# Patient Record
Sex: FEMALE | Race: BLACK OR AFRICAN AMERICAN | NOT HISPANIC OR LATINO | Employment: FULL TIME | URBAN - NONMETROPOLITAN AREA
[De-identification: names, ages, dates, MRNs, and addresses within clinical notes are randomized per-mention and may not be internally consistent; named-entity substitution may affect disease eponyms.]

---

## 2022-06-22 ENCOUNTER — HOSPITAL ENCOUNTER (EMERGENCY)
Facility: HOSPITAL | Age: 47
Discharge: OTHER INSTITUTION | End: 2022-06-22
Payer: COMMERCIAL

## 2022-06-22 VITALS
HEART RATE: 53 BPM | RESPIRATION RATE: 16 BRPM | SYSTOLIC BLOOD PRESSURE: 140 MMHG | OXYGEN SATURATION: 99 % | DIASTOLIC BLOOD PRESSURE: 81 MMHG | HEIGHT: 59 IN | WEIGHT: 202 LBS | TEMPERATURE: 98 F | BODY MASS INDEX: 40.72 KG/M2

## 2022-06-22 DIAGNOSIS — R07.9 CHEST PAIN: Primary | ICD-10-CM

## 2022-06-22 LAB
ALBUMIN SERPL BCP-MCNC: 3.4 G/DL (ref 3.5–5)
ALBUMIN/GLOB SERPL: 0.9 {RATIO}
ALP SERPL-CCNC: 69 U/L (ref 37–98)
ALT SERPL W P-5'-P-CCNC: 19 U/L (ref 13–56)
ANION GAP SERPL CALCULATED.3IONS-SCNC: 8 MMOL/L (ref 7–16)
AST SERPL W P-5'-P-CCNC: 18 U/L (ref 15–37)
BASOPHILS # BLD AUTO: 0.03 K/UL (ref 0–0.2)
BASOPHILS NFR BLD AUTO: 0.6 % (ref 0–1)
BILIRUB SERPL-MCNC: 0.3 MG/DL (ref 0–1.2)
BUN SERPL-MCNC: 12 MG/DL (ref 7–18)
BUN/CREAT SERPL: 16 (ref 6–20)
CALCIUM SERPL-MCNC: 8.9 MG/DL (ref 8.5–10.1)
CHLORIDE SERPL-SCNC: 103 MMOL/L (ref 98–107)
CHOLEST SERPL-MCNC: 179 MG/DL (ref 0–200)
CHOLEST/HDLC SERPL: 3.4 {RATIO}
CO2 SERPL-SCNC: 32 MMOL/L (ref 21–32)
CREAT SERPL-MCNC: 0.76 MG/DL (ref 0.55–1.02)
DIFFERENTIAL METHOD BLD: ABNORMAL
EOSINOPHIL # BLD AUTO: 0.05 K/UL (ref 0–0.5)
EOSINOPHIL NFR BLD AUTO: 1 % (ref 1–4)
ERYTHROCYTE [DISTWIDTH] IN BLOOD BY AUTOMATED COUNT: 11.6 % (ref 11.5–14.5)
GLOBULIN SER-MCNC: 3.8 G/DL (ref 2–4)
GLUCOSE SERPL-MCNC: 96 MG/DL (ref 74–106)
HCT VFR BLD AUTO: 36.1 % (ref 38–47)
HDLC SERPL-MCNC: 52 MG/DL (ref 40–60)
HGB BLD-MCNC: 11.8 G/DL (ref 12–16)
LDLC SERPL CALC-MCNC: 118 MG/DL
LDLC/HDLC SERPL: 2.3 {RATIO}
LYMPHOCYTES # BLD AUTO: 2.21 K/UL (ref 1–4.8)
LYMPHOCYTES NFR BLD AUTO: 43.6 % (ref 27–41)
MAGNESIUM SERPL-MCNC: 2 MG/DL (ref 1.7–2.3)
MCH RBC QN AUTO: 28.5 PG (ref 27–31)
MCHC RBC AUTO-ENTMCNC: 32.7 G/DL (ref 32–36)
MCV RBC AUTO: 87.2 FL (ref 80–96)
MONOCYTES # BLD AUTO: 0.48 K/UL (ref 0–0.8)
MONOCYTES NFR BLD AUTO: 9.5 % (ref 2–6)
MPC BLD CALC-MCNC: 9.7 FL (ref 9.4–12.4)
NEUTROPHILS # BLD AUTO: 2.3 K/UL (ref 1.8–7.7)
NEUTROPHILS NFR BLD AUTO: 45.3 % (ref 53–65)
NONHDLC SERPL-MCNC: 127 MG/DL
NT-PROBNP SERPL-MCNC: 58 PG/ML (ref 1–125)
PLATELET # BLD AUTO: 322 K/UL (ref 150–400)
POTASSIUM SERPL-SCNC: 3.7 MMOL/L (ref 3.5–5.1)
PROT SERPL-MCNC: 7.2 G/DL (ref 6.4–8.2)
RBC # BLD AUTO: 4.14 M/UL (ref 4.2–5.4)
SODIUM SERPL-SCNC: 139 MMOL/L (ref 136–145)
TRIGL SERPL-MCNC: 43 MG/DL (ref 35–150)
TROPONIN I SERPL HS-MCNC: 4.8 PG/ML
TROPONIN I SERPL HS-MCNC: 5.6 PG/ML
VLDLC SERPL-MCNC: 9 MG/DL
WBC # BLD AUTO: 5.07 K/UL (ref 4.5–11)

## 2022-06-22 PROCEDURE — 99285 EMERGENCY DEPT VISIT HI MDM: CPT | Mod: 25

## 2022-06-22 PROCEDURE — 85025 COMPLETE CBC W/AUTO DIFF WBC: CPT | Performed by: NURSE PRACTITIONER

## 2022-06-22 PROCEDURE — 83880 ASSAY OF NATRIURETIC PEPTIDE: CPT | Performed by: NURSE PRACTITIONER

## 2022-06-22 PROCEDURE — 80061 LIPID PANEL: CPT | Performed by: NURSE PRACTITIONER

## 2022-06-22 PROCEDURE — 93010 ELECTROCARDIOGRAM REPORT: CPT | Mod: ,,, | Performed by: INTERNAL MEDICINE

## 2022-06-22 PROCEDURE — 83735 ASSAY OF MAGNESIUM: CPT | Performed by: NURSE PRACTITIONER

## 2022-06-22 PROCEDURE — 93005 ELECTROCARDIOGRAM TRACING: CPT

## 2022-06-22 PROCEDURE — 84484 ASSAY OF TROPONIN QUANT: CPT | Performed by: NURSE PRACTITIONER

## 2022-06-22 PROCEDURE — 99284 PR EMERGENCY DEPT VISIT,LEVEL IV: ICD-10-PCS | Mod: ,,, | Performed by: NURSE PRACTITIONER

## 2022-06-22 PROCEDURE — 80053 COMPREHEN METABOLIC PANEL: CPT | Performed by: NURSE PRACTITIONER

## 2022-06-22 PROCEDURE — 25000003 PHARM REV CODE 250: Performed by: NURSE PRACTITIONER

## 2022-06-22 PROCEDURE — 99284 EMERGENCY DEPT VISIT MOD MDM: CPT | Mod: ,,, | Performed by: NURSE PRACTITIONER

## 2022-06-22 PROCEDURE — 93010 EKG 12-LEAD: ICD-10-PCS | Mod: 76,,, | Performed by: INTERNAL MEDICINE

## 2022-06-22 PROCEDURE — 36415 COLL VENOUS BLD VENIPUNCTURE: CPT | Performed by: NURSE PRACTITIONER

## 2022-06-22 RX ORDER — POTASSIUM CHLORIDE 20 MEQ/1
20 TABLET, EXTENDED RELEASE ORAL DAILY PRN
COMMUNITY
Start: 2022-04-01

## 2022-06-22 RX ORDER — ASPIRIN 325 MG
325 TABLET ORAL
Status: COMPLETED | OUTPATIENT
Start: 2022-06-22 | End: 2022-06-22

## 2022-06-22 RX ORDER — FUROSEMIDE 40 MG/1
40 TABLET ORAL DAILY
COMMUNITY
Start: 2022-04-01

## 2022-06-22 RX ORDER — ATENOLOL 50 MG/1
50 TABLET ORAL DAILY
COMMUNITY
Start: 2022-06-17

## 2022-06-22 RX ADMIN — NITROGLYCERIN 0.5 INCH: 20 OINTMENT TOPICAL at 11:06

## 2022-06-22 RX ADMIN — ASPIRIN 325 MG ORAL TABLET 325 MG: 325 PILL ORAL at 09:06

## 2022-06-22 NOTE — ED TRIAGE NOTES
Presents to ED with c/o chest pain on and off since last pm.  Sent to ED for evaluation by PCP, CRISELDA Barbour.  Mahsa denies nausea or SOB.  Woke up several times during the night with it hurting.  Report has been having a lot of swelling in her legs.

## 2022-06-22 NOTE — ED PROVIDER NOTES
Encounter Date: 6/22/2022       History     Chief Complaint   Patient presents with    Chest Pain     Pamela Rose is a 47 y/o AAF who presents to the ED with complaint of mid sternum chest pain that radiates to her back which woke her up at 12 midnight last night.    Has seen Dr. Ferny Michelle III at [facility] cardiology in [place] for similar symptoms in  2019.   Hx: partial hysterectomy    The history is provided by the patient.   Chest Pain  The current episode started several hours ago (12 midnight, woke her up from sleep). Duration of episode(s) is 1 minute. Chest pain occurs intermittently. The chest pain is unchanged. Associated with: movement. At its most intense, the chest pain is at 6/10. The chest pain is currently at 0/10. The quality of the pain is described as dull, similar to previous episodes and pressure-like. The pain radiates to the mid back. Exacerbated by: nothing. Pertinent negatives for primary symptoms include no fever, no fatigue, no syncope, no shortness of breath, no cough, no wheezing, no palpitations, no abdominal pain, no nausea, no vomiting, no dizziness and no altered mental status.   Pertinent negatives for associated symptoms include no diaphoresis, no lower extremity edema, no near-syncope, no numbness, no orthopnea, no paroxysmal nocturnal dyspnea and no weakness. She tried nothing for the symptoms. Risk factors include obesity and sedentary lifestyle.   Her past medical history is significant for hypertension.   Her family medical history is significant for CAD, heart disease, hypertension and early MI.   Pertinent negatives for family medical history include: no sudden death. Family history comments: mother had MI at age 36   Procedure history is positive for echocardiogram.   Procedure history is negative for cardiac catheterization, exercise treadmill test and coronary CTA. Procedure history comments: normal echocardiogram with EF 55-60% on 04/05/2019..     Review of  patient's allergies indicates:  Not on File  Past Medical History:   Diagnosis Date    Hypertension      Past Surgical History:   Procedure Laterality Date     SECTION      x3    HYSTERECTOMY       No family history on file.  Social History     Tobacco Use    Smoking status: Never Smoker    Smokeless tobacco: Never Used   Substance Use Topics    Alcohol use: Never    Drug use: Never     Review of Systems   Constitutional: Negative.  Negative for activity change, appetite change, diaphoresis, fatigue and fever.   HENT: Negative.    Eyes: Negative.    Respiratory: Negative.  Negative for cough, shortness of breath and wheezing.    Cardiovascular: Positive for chest pain and leg swelling. Negative for palpitations, orthopnea, syncope and near-syncope.        Swelling in BLE at times   Gastrointestinal: Negative for abdominal pain, diarrhea, nausea and vomiting.   Genitourinary: Negative.  Negative for dysuria and frequency.   Musculoskeletal: Negative.    Skin: Negative.    Neurological: Negative for dizziness, weakness and numbness.   Hematological: Negative.    Psychiatric/Behavioral: Negative.        Physical Exam     Initial Vitals [22 0930]   BP Pulse Resp Temp SpO2   124/76 60 16 97.9 °F (36.6 °C) 98 %      MAP       --         Physical Exam    Nursing note and vitals reviewed.  Constitutional: She appears well-developed and well-nourished. She is cooperative. She does not appear ill. No distress.   HENT:   Head: Normocephalic and atraumatic.   Right Ear: External ear normal.   Left Ear: External ear normal.   Mouth/Throat: Mucous membranes are normal.   Eyes: Conjunctivae and EOM are normal. Pupils are equal, round, and reactive to light.   Neck: Neck supple.   Normal range of motion.  Cardiovascular: Normal rate, regular rhythm and normal heart sounds.   Pulses:       Radial pulses are 2+ on the right side and 2+ on the left side.        Posterior tibial pulses are 2+ on the right side and  2+ on the left side.   No edema to BLE   Pulmonary/Chest: Effort normal and breath sounds normal. She exhibits no tenderness and no bony tenderness.   Abdominal: Abdomen is soft. Bowel sounds are normal. There is no abdominal tenderness.   Musculoskeletal:      Cervical back: Normal range of motion and neck supple.     Lymphadenopathy:     She has no cervical adenopathy.   Neurological: She is alert and oriented to person, place, and time. GCS eye subscore is 4. GCS verbal subscore is 5. GCS motor subscore is 6.   Skin: Skin is warm, dry and intact. Capillary refill takes less than 2 seconds. No rash noted.   Psychiatric: She has a normal mood and affect. Her speech is normal and behavior is normal.         Medical Screening Exam   See Full Note    ED Course   Procedures  Labs Reviewed   COMPREHENSIVE METABOLIC PANEL - Abnormal; Notable for the following components:       Result Value    Albumin 3.4 (*)     All other components within normal limits   CBC WITH DIFFERENTIAL - Abnormal; Notable for the following components:    RBC 4.14 (*)     Hemoglobin 11.8 (*)     Hematocrit 36.1 (*)     Neutrophils % 45.3 (*)     Lymphocytes % 43.6 (*)     Monocytes % 9.5 (*)     All other components within normal limits   TROPONIN I - Normal   NT-PRO NATRIURETIC PEPTIDE - Normal   MAGNESIUM - Normal   TROPONIN I - Normal   CBC W/ AUTO DIFFERENTIAL    Narrative:     The following orders were created for panel order CBC auto differential.  Procedure                               Abnormality         Status                     ---------                               -----------         ------                     CBC with Differential[[id number]]        Abnormal            Final result                 Please view results for these tests on the individual orders.   LIPID PANEL     EKG Readings: (Independently Interpreted)   Initial Reading: No STEMI. Heart Rate: 62. Other Impression: Sinus rhythm with sinus arrhythmia     ECG Results           EKG 12-lead (In process)  Result time 06/22/22 11:43:24    In process by Interface, Lab In [facility] (06/22/22 11:43:24)                 Narrative:    Test Reason : R07.9,    Vent. Rate : 053 BPM     Atrial Rate : 053 BPM     P-R Int : 160 ms          QRS Dur : 084 ms      QT Int : 464 ms       P-R-T Axes : 055 -16 020 degrees     QTc Int : 435 ms    Sinus bradycardia  Minimal voltage criteria for LVH, may be normal variant  Borderline Abnormal ECG  When compared with ECG of 22-JUN-2022 09:42,  No significant change was found    Referred By: [email or web address]   SELF           Confirmed By:                              EKG 12-lead (In process)  Result time 06/22/22 09:49:31    In process by Interface, Lab In [facility] (06/22/22 09:49:31)                 Narrative:    Test Reason : R07.9,    Vent. Rate : 062 BPM     Atrial Rate : 062 BPM     P-R Int : 156 ms          QRS Dur : 080 ms      QT Int : 436 ms       P-R-T Axes : 060 -21 012 degrees     QTc Int : 442 ms    Normal sinus rhythm with sinus arrhythmia  Moderate voltage criteria for LVH, may be normal variant  Borderline Abnormal ECG  No previous ECGs available    Referred By: AAAREFERR   SELF           Confirmed By:                             Imaging Results          X-Ray Chest AP Portable (Final result)  Result time 06/22/22 09:57:55    Final result by Milan Luther II, MD (06/22/22 09:57:55)                 Impression:      No evidence of cardiopulmonary disease.      Electronically signed by: Milan Luther  Date:    06/22/2022  Time:    09:57             Narrative:    EXAMINATION:  XR CHEST AP PORTABLE    CLINICAL HISTORY:  Chest Pain;    COMPARISON:  One February 2020    FINDINGS:  The heart and mediastinum are normal in size and configuration.  The pulmonary vascularity is normal in caliber.  No lung infiltrates, effusions, pneumothorax or other abnormality is demonstrated.                                 Medications   aspirin tablet 325 mg (325 mg Oral  Given 6/22/22 0984)   nitroGLYCERIN 2% TD oint ointment 0.5 inch (0.5 inches Topical (Top) Given 6/22/22 1131)     Medical Decision Making:   Clinical Tests:   Lab Tests: Ordered and Reviewed  The following lab test(s) were unremarkable: CBC, CMP, Troponin and BNP  Radiological Study: Ordered  ED Management:  11:15 During inital exam patient had no chest pain. Was given  mg upon arrival. EKG NSR, HR 62 bpm. Troponin 5.6. Unable to reproduce pain with palpation or movement. Reviewed all test results with patient. Now reports that pain comes and goes while lying on stretcher here in the ED. Pain is dull with tightness in center of chest, lasting few seconds then resolves, no radiation of pain, rates as 6 on 1-10 pain scale. No sob, N/V or diapheresis.  Will repeat EKG. Place NTG top to chest wall.   HEART Score is 2  11:42 Repeat EKG Sinus juancarlos with HR at 53 bpm. BP dropped down to 98/77 HR 50. Nitropaste removed. Denies any chest pain at present.   12:13 Lipid panel normal. Second troponin 4.8. Reviewed all lab,xray and test results with patient. Patient continues to have chest pain that comes and goes while in the ED. Instructed her that she would need an evaluation by cardiology due to continued chest pain that is not reproducible. She wishes to go back to [facility] in [place] where Dr. Michelle III. Instructed patient that I would put in transfer request, but may not be able to be transferred to [facility] that it may be another hospital.    13:00 Call from [facility]. [facility] and [facility] at [facility]. Patient requested .   13:23: Call from [facility]. Spoke with transfer center at . Dr. Giles. [phone].                    Clinical Impression:   Final diagnoses:  [R07.9] Chest pain (Primary)          ED Disposition Condition    Transfer to Another Facility Stable                     Marybeth Echols, CRISELDA  06/22/22 4476

## 2022-06-23 ENCOUNTER — TELEPHONE (OUTPATIENT)
Dept: EMERGENCY MEDICINE | Facility: HOSPITAL | Age: 47
End: 2022-06-23
Payer: COMMERCIAL

## 2022-06-24 ENCOUNTER — TELEPHONE (OUTPATIENT)
Dept: EMERGENCY MEDICINE | Facility: HOSPITAL | Age: 47
End: 2022-06-24
Payer: COMMERCIAL

## 2022-06-25 ENCOUNTER — TELEPHONE (OUTPATIENT)
Dept: EMERGENCY MEDICINE | Facility: HOSPITAL | Age: 47
End: 2022-06-25
Payer: COMMERCIAL

## 2023-01-03 PROCEDURE — 99283 PR EMERGENCY DEPT VISIT,LEVEL III: ICD-10-PCS | Mod: ,,, | Performed by: REGISTERED NURSE

## 2023-01-03 PROCEDURE — 87428 SARSCOV & INF VIR A&B AG IA: CPT | Performed by: REGISTERED NURSE

## 2023-01-03 PROCEDURE — 87081 CULTURE SCREEN ONLY: CPT | Performed by: REGISTERED NURSE

## 2023-01-03 PROCEDURE — 99282 EMERGENCY DEPT VISIT SF MDM: CPT

## 2023-01-03 PROCEDURE — 99283 EMERGENCY DEPT VISIT LOW MDM: CPT | Mod: ,,, | Performed by: REGISTERED NURSE

## 2023-01-03 PROCEDURE — 87880 STREP A ASSAY W/OPTIC: CPT | Performed by: REGISTERED NURSE

## 2023-01-04 ENCOUNTER — HOSPITAL ENCOUNTER (EMERGENCY)
Facility: HOSPITAL | Age: 48
Discharge: HOME | End: 2023-01-04
Payer: COMMERCIAL

## 2023-01-04 VITALS
RESPIRATION RATE: 18 BRPM | TEMPERATURE: 98 F | SYSTOLIC BLOOD PRESSURE: 124 MMHG | BODY MASS INDEX: 40.32 KG/M2 | WEIGHT: 200 LBS | DIASTOLIC BLOOD PRESSURE: 86 MMHG | HEIGHT: 59 IN | HEART RATE: 80 BPM | OXYGEN SATURATION: 97 %

## 2023-01-04 DIAGNOSIS — J06.9 VIRAL URI WITH COUGH: Primary | ICD-10-CM

## 2023-01-04 LAB
FLUAV AG UPPER RESP QL IA.RAPID: NEGATIVE
FLUBV AG UPPER RESP QL IA.RAPID: NEGATIVE
RAPID GROUP A STREP: NEGATIVE
SARS-COV+SARS-COV-2 AG RESP QL IA.RAPID: NEGATIVE

## 2023-01-04 NOTE — ED TRIAGE NOTES
Patient arrived c/o of body aches and coughing with slight drainage. No fever reported. Denies SOB. PT has been around family/ friends with flu.

## 2023-01-04 NOTE — Clinical Note
"Pamela"Angel Luis Rose was seen and treated in our emergency department on 1/3/2023.  She may return to work on 01/05/2023.       If you have any questions or concerns, please don't hesitate to call.      Narda Cantu, LAZARA-C"

## 2023-01-06 LAB — DEPRECATED S PYO AG THROAT QL EIA: NORMAL

## 2023-03-03 ENCOUNTER — HOSPITAL ENCOUNTER (EMERGENCY)
Facility: HOSPITAL | Age: 48
Discharge: HOME | End: 2023-03-03
Attending: FAMILY MEDICINE
Payer: COMMERCIAL

## 2023-03-03 VITALS
HEIGHT: 59 IN | DIASTOLIC BLOOD PRESSURE: 91 MMHG | TEMPERATURE: 98 F | HEART RATE: 72 BPM | OXYGEN SATURATION: 99 % | WEIGHT: 202 LBS | SYSTOLIC BLOOD PRESSURE: 150 MMHG | RESPIRATION RATE: 18 BRPM | BODY MASS INDEX: 40.72 KG/M2

## 2023-03-03 DIAGNOSIS — K08.89 PAIN, DENTAL: Primary | ICD-10-CM

## 2023-03-03 DIAGNOSIS — K08.89 TOOTHACHE: ICD-10-CM

## 2023-03-03 DIAGNOSIS — K04.7 DENTAL INFECTION: ICD-10-CM

## 2023-03-03 PROCEDURE — 99284 EMERGENCY DEPT VISIT MOD MDM: CPT

## 2023-03-03 PROCEDURE — 99283 EMERGENCY DEPT VISIT LOW MDM: CPT | Mod: ,,, | Performed by: FAMILY MEDICINE

## 2023-03-03 PROCEDURE — 96372 THER/PROPH/DIAG INJ SC/IM: CPT | Performed by: FAMILY MEDICINE

## 2023-03-03 PROCEDURE — 63600175 PHARM REV CODE 636 W HCPCS: Performed by: FAMILY MEDICINE

## 2023-03-03 PROCEDURE — 99283 PR EMERGENCY DEPT VISIT,LEVEL III: ICD-10-PCS | Mod: ,,, | Performed by: FAMILY MEDICINE

## 2023-03-03 RX ORDER — KETOROLAC TROMETHAMINE 30 MG/ML
60 INJECTION, SOLUTION INTRAMUSCULAR; INTRAVENOUS
Status: COMPLETED | OUTPATIENT
Start: 2023-03-03 | End: 2023-03-03

## 2023-03-03 RX ORDER — PENICILLIN V POTASSIUM 500 MG/1
500 TABLET, FILM COATED ORAL 4 TIMES DAILY
Qty: 40 TABLET | Refills: 0 | Status: SHIPPED | OUTPATIENT
Start: 2023-03-03 | End: 2023-03-10

## 2023-03-03 RX ADMIN — KETOROLAC TROMETHAMINE 60 MG: 30 INJECTION, SOLUTION INTRAMUSCULAR; INTRAVENOUS at 07:03

## 2023-03-03 NOTE — Clinical Note
"Pamela"Angel Luis Rose was seen and treated in our emergency department on 3/3/2023.  She may return to work on 03/06/2023.       If you have any questions or concerns, please don't hesitate to call.      Vikki Dalton MD"

## 2023-03-03 NOTE — ED TRIAGE NOTES
Presents with c/o left upper jaw pain for 2 day and swelling x 1 day.  States cant breath out of her left nostril.

## 2023-03-03 NOTE — ED PROVIDER NOTES
Encounter Date: 3/3/2023       History     Chief Complaint   Patient presents with    Dental Pain    Oral Swelling     Pt here with left sided facial swelling and tooth pain. States symptoms started 2 days ago. As not seen dentist in a while.     The history is provided by the patient.   Review of patient's allergies indicates:  No Known Allergies  Past Medical History:   Diagnosis Date    Hypertension      Past Surgical History:   Procedure Laterality Date     SECTION      x3    HYSTERECTOMY       History reviewed. No pertinent family history.  Social History     Tobacco Use    Smoking status: Never    Smokeless tobacco: Never   Substance Use Topics    Alcohol use: Never    Drug use: Never     Review of Systems   HENT:  Positive for dental problem and facial swelling.         Facial swelling     Physical Exam     Initial Vitals [23 0655]   BP Pulse Resp Temp SpO2   (!) 150/91 72 18 98.1 °F (36.7 °C) 99 %      MAP       --         Physical Exam    Constitutional: She appears well-developed and well-nourished.   HENT:   Head: Normocephalic and atraumatic.   Swelling left maxially area, +broken tooth upper left   Eyes: EOM are normal. Pupils are equal, round, and reactive to light.   Neck: Neck supple.   Normal range of motion.  Cardiovascular:  Normal rate, regular rhythm and normal heart sounds.           Pulmonary/Chest: Breath sounds normal.   Abdominal: Abdomen is soft.   Musculoskeletal:         General: Normal range of motion.      Cervical back: Normal range of motion and neck supple.     Neurological: She is alert and oriented to person, place, and time.   Psychiatric: She has a normal mood and affect. Her behavior is normal. Thought content normal.       Medical Screening Exam   See Full Note    ED Course   Procedures  Labs Reviewed - No data to display       Imaging Results    None          Medications - No data to display  Medical Decision Making:   ED Management:  Start on oral  antibiotics  Toradol given in er  Advised to call dentist for appt                 Clinical Impression:   Final diagnoses:  [K08.89] Pain, dental (Primary)  [K08.89] Toothache  [K04.7] Dental infection        ED Disposition Condition    Discharge Stable          ED Prescriptions       Medication Sig Dispense Start Date End Date Auth. Provider    penicillin v potassium (VEETID) 500 MG tablet Take 1 tablet (500 mg total) by mouth 4 (four) times daily. for 7 days 40 tablet 3/3/2023 3/10/2023 Vikki Dalton MD          Follow-up Information       Follow up With Specialties Details Why Contact Info    Shelton Parisi Jr., NP Critical Care Medicine   [address] [place] [id number] [phone]               Vikki Dalton MD  03/03/23 0745

## 2023-03-06 ENCOUNTER — TELEPHONE (OUTPATIENT)
Dept: EMERGENCY MEDICINE | Facility: HOSPITAL | Age: 48
End: 2023-03-06
Payer: COMMERCIAL

## 2023-07-05 ENCOUNTER — HOSPITAL ENCOUNTER (EMERGENCY)
Facility: HOSPITAL | Age: 48
Discharge: HOME | End: 2023-07-05
Payer: COMMERCIAL

## 2023-07-05 VITALS
OXYGEN SATURATION: 98 % | TEMPERATURE: 99 F | RESPIRATION RATE: 16 BRPM | HEIGHT: 59 IN | SYSTOLIC BLOOD PRESSURE: 138 MMHG | DIASTOLIC BLOOD PRESSURE: 80 MMHG | WEIGHT: 205 LBS | BODY MASS INDEX: 41.33 KG/M2 | HEART RATE: 60 BPM

## 2023-07-05 DIAGNOSIS — M25.511 RIGHT SHOULDER PAIN: ICD-10-CM
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
## 2023-07-05 DIAGNOSIS — M62.838 MUSCLE SPASMS OF NECK: ICD-10-CM

## 2023-07-05 DIAGNOSIS — M54.2 NECK PAIN: ICD-10-CM

## 2023-07-05 PROCEDURE — 63600175 PHARM REV CODE 636 W HCPCS: Performed by: NURSE PRACTITIONER

## 2023-07-05 PROCEDURE — 99284 PR EMERGENCY DEPT VISIT,LEVEL IV: ICD-10-PCS | Mod: ,,, | Performed by: NURSE PRACTITIONER

## 2023-07-05 PROCEDURE — 99284 EMERGENCY DEPT VISIT MOD MDM: CPT

## 2023-07-05 PROCEDURE — 99284 EMERGENCY DEPT VISIT MOD MDM: CPT | Mod: ,,, | Performed by: NURSE PRACTITIONER

## 2023-07-05 PROCEDURE — 96372 THER/PROPH/DIAG INJ SC/IM: CPT | Performed by: NURSE PRACTITIONER

## 2023-07-05 RX ORDER — KETOROLAC TROMETHAMINE 30 MG/ML
30 INJECTION, SOLUTION INTRAMUSCULAR; INTRAVENOUS
Status: COMPLETED | OUTPATIENT
Start: 2023-07-05 | End: 2023-07-05

## 2023-07-05 RX ORDER — NAPROXEN 500 MG/1
500 TABLET ORAL 2 TIMES DAILY WITH MEALS
Qty: 20 TABLET | Refills: 0 | Status: SHIPPED | OUTPATIENT
Start: 2023-07-05 | End: 2023-07-15

## 2023-07-05 RX ORDER — CYCLOBENZAPRINE HCL 10 MG
10 TABLET ORAL 3 TIMES DAILY PRN
Qty: 15 TABLET | Refills: 0 | Status: SHIPPED | OUTPATIENT
Start: 2023-07-05 | End: 2023-07-10

## 2023-07-05 RX ADMIN — KETOROLAC TROMETHAMINE 30 MG: 30 INJECTION, SOLUTION INTRAMUSCULAR; INTRAVENOUS at 10:07

## 2023-07-05 NOTE — Clinical Note
"Pamela"Angel Luis Rose was seen and treated in our emergency department on 7/5/2023.  She may return to work on 07/06/2023.       If you have any questions or concerns, please don't hesitate to call.      CRISELDA Pendleton"

## 2023-07-05 NOTE — ED PROVIDER NOTES
Encounter Date: 2023       History     Chief Complaint   Patient presents with    Fall     48 y/o AAF presents to the ED with complaint of neck and right shoulder pain after falling on wooden deck while fishing yesterday. States she just slipped and fell. Did not hit head and had no LOC. Pain is constant, but worse with movement. Rates 6/10. Last took Tylenol at 5 am this morning. Denies numbness, tingling or weakness.    The history is provided by the patient.   Review of patient's allergies indicates:  No Known Allergies  Past Medical History:   Diagnosis Date    Hypertension      Past Surgical History:   Procedure Laterality Date     SECTION      x3    HYSTERECTOMY       History reviewed. No pertinent family history.  Social History     Tobacco Use    Smoking status: Never    Smokeless tobacco: Never   Substance Use Topics    Alcohol use: Never    Drug use: Never     Review of Systems   Constitutional: Negative.    HENT: Negative.     Eyes: Negative.    Respiratory: Negative.     Cardiovascular: Negative.    Gastrointestinal: Negative.    Genitourinary: Negative.    Musculoskeletal:  Positive for arthralgias (rigth shoulder), myalgias and neck pain. Negative for back pain, gait problem and neck stiffness.   Skin:  Negative for color change and wound.   Neurological: Negative.  Negative for dizziness, weakness, light-headedness, numbness and headaches.   Hematological: Negative.    Psychiatric/Behavioral: Negative.       Physical Exam     Initial Vitals [23 0855]   BP Pulse Resp Temp SpO2   138/80 60 16 98.6 °F (37 °C) 98 %      MAP       --         Physical Exam    Vitals reviewed.  Constitutional: Vital signs are normal. She appears well-developed and well-nourished. She is Obese . She does not have a sickly appearance. She does not appear ill. No distress.   HENT:   Head: Normocephalic and atraumatic.   Right Ear: External ear normal.   Left Ear: External ear normal.   Nose: Nose normal.    Mouth/Throat: Oropharynx is clear and moist and mucous membranes are normal.   Eyes: Conjunctivae, EOM and lids are normal. Pupils are equal, round, and reactive to light.   Neck: Neck supple. No crepitus.   Cardiovascular:  Normal rate, regular rhythm, normal heart sounds, intact distal pulses and normal pulses.           Pulmonary/Chest: Effort normal and breath sounds normal. No respiratory distress. She exhibits no tenderness and no bony tenderness.   Abdominal: Abdomen is soft. There is no abdominal tenderness. There is guarding.   Musculoskeletal:      Right shoulder: Tenderness and bony tenderness present. No swelling, deformity, effusion or crepitus. Decreased range of motion. Normal strength. Normal pulse.      Left shoulder: Normal.      Cervical back: Neck supple. Spasms, tenderness and bony tenderness present. No swelling, edema, deformity, erythema, lacerations, rigidity or crepitus. Pain with movement, spinous process tenderness and muscular tenderness present. Decreased range of motion.      Thoracic back: Normal.      Lumbar back: Normal.     Neurological: She is alert and oriented to person, place, and time. She has normal strength. No sensory deficit. Coordination and gait normal. GCS eye subscore is 4. GCS verbal subscore is 5. GCS motor subscore is 6.   Skin: Skin is warm, dry and intact. Capillary refill takes less than 2 seconds.   Psychiatric: She has a normal mood and affect. Her speech is normal and behavior is normal. Thought content normal.       Medical Screening Exam   See Full Note    ED Course   Procedures  Labs Reviewed - No data to display       Imaging Results              X-Ray Shoulder Complete 2 View Right (Final result)  Result time 07/05/23 10:27:31      Final result by Valentin Hsieh DO (07/05/23 10:27:31)                   Impression:      As above.    Point of Service: [facility]      Electronically signed by: Valentin  Мария  Date:    07/05/2023  Time:    10:27               Narrative:    EXAMINATION:  XR SHOULDER COMPLETE 2 OR MORE VIEWS RIGHT    CLINICAL HISTORY:  Pain in right shoulder    COMPARISON:  None    TECHNIQUE:  Frontal views of the right shoulder were obtained in internal and external rotation as well as a scapular Y-view of this shoulder .    FINDINGS:  No convincing acute fracture or dislocation demonstrated. No concerning radiopaque foreign body visualized.                                       X-Ray Cervical Spine AP And Lateral (Final result)  Result time 07/05/23 10:28:30      Final result by Valentin Hsieh DO (07/05/23 10:28:30)                   Impression:      As above.    Point of Service: [facility]      Electronically signed by: Valentin Hsieh  Date:    07/05/2023  Time:    10:28               Narrative:    EXAMINATION:  XR CERVICAL SPINE AP LATERAL    CLINICAL HISTORY:  Cervicalgia    COMPARISON:  None    TECHNIQUE:  Frontal, lateral, and open-mouth odontoid views of the cervical spine.    FINDINGS:  Cervical vertebral body heights and alignment appear maintained.  Disc spaces appear maintained.                                       Medications   ketorolac injection 30 mg (has no administration in time range)     Medical Decision Making:   ED Management:  MDM:  Patient presents for emergent evaluation of acute fall with complaint of neck and right shoulder pain.  In the ED patient found to have VS wnl.  Tenderness with palpation of cervical spine and bilateral PVM. Right shoulder pain with palpation and limited ROM due to pain. Bilateral radial pulses present and equal. No neurovascular compromise noted. NAD noted.  I ordered X-rays and personally reviewed them and reviewed the radiologist interpretation.  Cervical and right shoulder x-ray: no fractures or dislocation noted.      Discharge MDM  I discussed x-ray reports with patient  Patient was managed in the ED with Toradol 30 mg IM x  1.  The response to treatment was pain improved.  Prescription for naproxen and flexeril given. Discharge instructions discussed.  Patient was discharged in stable condition.  Detailed return precautions discussed. Patient will follow up with PCP. She agreed to treatment plan and verbalized understanding.                       Clinical Impression:   Final diagnoses:  [M54.2] Neck pain  [M25.511] Right shoulder pain  [W19.XXXA] Fall, initial encounter (Primary)  [M62.838] Muscle spasms of neck        ED Disposition Condition    Discharge Stable          ED Prescriptions       Medication Sig Dispense Start Date End Date Auth. Provider    naproxen (NAPROSYN) 500 MG tablet Take 1 tablet (500 mg total) by mouth 2 (two) times daily with meals. for 10 days 20 tablet 7/5/2023 7/15/2023 CRISELDA Pendleton    cyclobenzaprine (FLEXERIL) 10 MG tablet Take 1 tablet (10 mg total) by mouth 3 (three) times daily as needed for Muscle spasms. 15 tablet 7/5/2023 7/10/2023 CRISELDA Pendleton          Follow-up Information       Follow up With Specialties Details Why Contact Info    Shelton Parisi Jr., NP Critical Care Medicine Call in 1 week If symptoms worsen [address] [id number] [phone]               CRISELDA Pendleton  07/05/23 4342

## 2023-07-05 NOTE — DISCHARGE INSTRUCTIONS
-Start Naproxen this afternoon for pain and inflammation. Do not take Motrin, Ibuprofen or Aleve while on this medication.  -Take Flexeril 10 mg as directed for muscle spasms. Do not take while working or driving due to will cause drowsiness.  -May alternate ice pack with moist heat to help with pain.

## 2023-07-05 NOTE — ED TRIAGE NOTES
Presents with c/o right shoulder and upper back and neck pain s/p slipping and falling while fishing yesterday.  Reports did not hit head..  Took Tylenol about 5 am today

## 2023-07-08 ENCOUNTER — TELEPHONE (OUTPATIENT)
Dept: EMERGENCY MEDICINE | Facility: HOSPITAL | Age: 48
End: 2023-07-08
Payer: COMMERCIAL

## 2023-10-23 ENCOUNTER — HOSPITAL ENCOUNTER (EMERGENCY)
Facility: HOSPITAL | Age: 48
Discharge: HOME | End: 2023-10-23
Payer: COMMERCIAL

## 2023-10-23 VITALS
BODY MASS INDEX: 41.33 KG/M2 | SYSTOLIC BLOOD PRESSURE: 150 MMHG | OXYGEN SATURATION: 100 % | WEIGHT: 205 LBS | HEART RATE: 84 BPM | RESPIRATION RATE: 18 BRPM | HEIGHT: 59 IN | DIASTOLIC BLOOD PRESSURE: 88 MMHG

## 2023-10-23 DIAGNOSIS — N83.201 CYSTS OF BOTH OVARIES: ICD-10-CM

## 2023-10-23 DIAGNOSIS — R10.9 ABDOMINAL PAIN, UNSPECIFIED ABDOMINAL LOCATION: ICD-10-CM

## 2023-10-23 DIAGNOSIS — N83.202 CYSTS OF BOTH OVARIES: ICD-10-CM

## 2023-10-23 DIAGNOSIS — R11.2 NAUSEA AND VOMITING, UNSPECIFIED VOMITING TYPE: Primary | ICD-10-CM

## 2023-10-23 LAB
ALBUMIN SERPL BCP-MCNC: 3.3 G/DL (ref 3.5–5)
ALBUMIN/GLOB SERPL: 0.7 {RATIO}
ALP SERPL-CCNC: 71 U/L (ref 39–100)
ALT SERPL W P-5'-P-CCNC: 24 U/L (ref 13–56)
ANION GAP SERPL CALCULATED.3IONS-SCNC: 12 MMOL/L (ref 7–16)
AST SERPL W P-5'-P-CCNC: 18 U/L (ref 15–37)
BASOPHILS # BLD AUTO: 0.01 K/UL (ref 0–0.2)
BASOPHILS NFR BLD AUTO: 0.1 % (ref 0–1)
BILIRUB SERPL-MCNC: 0.4 MG/DL (ref ?–1.2)
BILIRUB UR QL STRIP: NEGATIVE
BUN SERPL-MCNC: 15 MG/DL (ref 7–18)
BUN/CREAT SERPL: 18 (ref 6–20)
CALCIUM SERPL-MCNC: 8.7 MG/DL (ref 8.5–10.1)
CHLORIDE SERPL-SCNC: 103 MMOL/L (ref 98–107)
CLARITY UR: CLEAR
CO2 SERPL-SCNC: 26 MMOL/L (ref 21–32)
COLOR UR: YELLOW
CREAT SERPL-MCNC: 0.84 MG/DL (ref 0.55–1.02)
DIFFERENTIAL METHOD BLD: ABNORMAL
EGFR (NO RACE VARIABLE) (RUSH/TITUS): 87 ML/MIN/1.73M2
EOSINOPHIL # BLD AUTO: 0.02 K/UL (ref 0–0.5)
EOSINOPHIL NFR BLD AUTO: 0.3 % (ref 1–4)
ERYTHROCYTE [DISTWIDTH] IN BLOOD BY AUTOMATED COUNT: 11.7 % (ref 11.5–14.5)
GLOBULIN SER-MCNC: 4.6 G/DL (ref 2–4)
GLUCOSE SERPL-MCNC: 107 MG/DL (ref 74–106)
GLUCOSE UR STRIP-MCNC: NEGATIVE MG/DL
HCT VFR BLD AUTO: 38.8 % (ref 38–47)
HGB BLD-MCNC: 12.5 G/DL (ref 12–16)
KETONES UR STRIP-SCNC: NEGATIVE MG/DL
LACTATE SERPL-SCNC: 0.6 MMOL/L (ref 0.4–2)
LACTATE SERPL-SCNC: 2.6 MMOL/L (ref 0.4–2)
LEUKOCYTE ESTERASE UR QL STRIP: NEGATIVE
LIPASE SERPL-CCNC: 51 U/L (ref 73–393)
LYMPHOCYTES # BLD AUTO: 2.26 K/UL (ref 1–4.8)
LYMPHOCYTES NFR BLD AUTO: 32.8 % (ref 27–41)
MCH RBC QN AUTO: 28.7 PG (ref 27–31)
MCHC RBC AUTO-ENTMCNC: 32.2 G/DL (ref 32–36)
MCV RBC AUTO: 89 FL (ref 80–96)
MONOCYTES # BLD AUTO: 0.49 K/UL (ref 0–0.8)
MONOCYTES NFR BLD AUTO: 7.1 % (ref 2–6)
MPC BLD CALC-MCNC: 9.8 FL (ref 9.4–12.4)
NEUTROPHILS # BLD AUTO: 4.1 K/UL (ref 1.8–7.7)
NEUTROPHILS NFR BLD AUTO: 59.7 % (ref 53–65)
NITRITE UR QL STRIP: NEGATIVE
PH UR STRIP: 8 PH UNITS
PLATELET # BLD AUTO: 336 K/UL (ref 150–400)
POTASSIUM SERPL-SCNC: 3.6 MMOL/L (ref 3.5–5.1)
PROT SERPL-MCNC: 7.9 G/DL (ref 6.4–8.2)
PROT UR QL STRIP: NEGATIVE
RBC # BLD AUTO: 4.36 M/UL (ref 4.2–5.4)
RBC # UR STRIP: NEGATIVE /UL
SODIUM SERPL-SCNC: 137 MMOL/L (ref 136–145)
SP GR UR STRIP: 1.02
UROBILINOGEN UR STRIP-ACNC: 0.2 MG/DL
WBC # BLD AUTO: 6.88 K/UL (ref 4.5–11)

## 2023-10-23 PROCEDURE — 96361 HYDRATE IV INFUSION ADD-ON: CPT

## 2023-10-23 PROCEDURE — 99284 PR EMERGENCY DEPT VISIT,LEVEL IV: ICD-10-PCS | Mod: ,,, | Performed by: NURSE PRACTITIONER

## 2023-10-23 PROCEDURE — 83690 ASSAY OF LIPASE: CPT | Performed by: NURSE PRACTITIONER

## 2023-10-23 PROCEDURE — 96374 THER/PROPH/DIAG INJ IV PUSH: CPT

## 2023-10-23 PROCEDURE — 63600175 PHARM REV CODE 636 W HCPCS: Performed by: NURSE PRACTITIONER

## 2023-10-23 PROCEDURE — 25000003 PHARM REV CODE 250: Performed by: NURSE PRACTITIONER

## 2023-10-23 PROCEDURE — 80053 COMPREHEN METABOLIC PANEL: CPT | Performed by: NURSE PRACTITIONER

## 2023-10-23 PROCEDURE — 81003 URINALYSIS AUTO W/O SCOPE: CPT | Performed by: NURSE PRACTITIONER

## 2023-10-23 PROCEDURE — 85025 COMPLETE CBC W/AUTO DIFF WBC: CPT | Performed by: NURSE PRACTITIONER

## 2023-10-23 PROCEDURE — 99285 EMERGENCY DEPT VISIT HI MDM: CPT | Mod: 25

## 2023-10-23 PROCEDURE — 83605 ASSAY OF LACTIC ACID: CPT | Performed by: NURSE PRACTITIONER

## 2023-10-23 PROCEDURE — 99284 EMERGENCY DEPT VISIT MOD MDM: CPT | Mod: ,,, | Performed by: NURSE PRACTITIONER

## 2023-10-23 PROCEDURE — 96375 TX/PRO/DX INJ NEW DRUG ADDON: CPT

## 2023-10-23 RX ORDER — ONDANSETRON 2 MG/ML
4 INJECTION INTRAMUSCULAR; INTRAVENOUS
Status: COMPLETED | OUTPATIENT
Start: 2023-10-23 | End: 2023-10-23

## 2023-10-23 RX ORDER — ONDANSETRON 4 MG/1
4 TABLET, ORALLY DISINTEGRATING ORAL EVERY 6 HOURS PRN
Qty: 20 TABLET | Refills: 0 | Status: SHIPPED | OUTPATIENT
Start: 2023-10-23

## 2023-10-23 RX ORDER — KETOROLAC TROMETHAMINE 30 MG/ML
30 INJECTION, SOLUTION INTRAMUSCULAR; INTRAVENOUS
Status: COMPLETED | OUTPATIENT
Start: 2023-10-23 | End: 2023-10-23

## 2023-10-23 RX ORDER — HYDROMORPHONE HYDROCHLORIDE 2 MG/ML
1 INJECTION, SOLUTION INTRAMUSCULAR; INTRAVENOUS; SUBCUTANEOUS
Status: COMPLETED | OUTPATIENT
Start: 2023-10-23 | End: 2023-10-23

## 2023-10-23 RX ADMIN — HYDROMORPHONE HYDROCHLORIDE 1 MG: 2 INJECTION, SOLUTION INTRAMUSCULAR; INTRAVENOUS; SUBCUTANEOUS at 09:10

## 2023-10-23 RX ADMIN — SODIUM CHLORIDE 1000 ML: 9 INJECTION, SOLUTION INTRAVENOUS at 09:10

## 2023-10-23 RX ADMIN — KETOROLAC TROMETHAMINE 30 MG: 30 INJECTION INTRAMUSCULAR; INTRAVENOUS at 10:10

## 2023-10-23 RX ADMIN — ONDANSETRON 4 MG: 2 INJECTION INTRAMUSCULAR; INTRAVENOUS at 09:10

## 2023-10-23 NOTE — Clinical Note
"Pamela"Angel Luis Rose was seen and treated in our emergency department on 10/23/2023.  She may return to work on 10/25/2023.       If you have any questions or concerns, please don't hesitate to call.      Marybeth Echols, BOP"

## 2023-10-23 NOTE — ED TRIAGE NOTES
Patient arrived to the ER c/o of right side abdomen pain and vomiting clear liquid that started at 8 this morning. Patient stated she is tender to the right side. Denies shortness of breath or chest pain. Normal BM yesterday.

## 2023-10-23 NOTE — ED PROVIDER NOTES
Encounter Date: 10/23/2023       History     Chief Complaint   Patient presents with    Nausea    Abdominal Pain     47 y/o female arrived to the ED with complaint of sudden onset of right flank pain radiating to right lower quad with nausea and vomiting that started at 08:00 am this morning. Denies fever, dysuria or hematuria.   Last meal was tacos last night. Other family members ate the same thing and no one else in home is sick.     The history is provided by the patient.     Review of patient's allergies indicates:  No Known Allergies  Past Medical History:   Diagnosis Date    Hypertension      Past Surgical History:   Procedure Laterality Date     SECTION      x3    HYSTERECTOMY       History reviewed. No pertinent family history.  Social History     Tobacco Use    Smoking status: Never    Smokeless tobacco: Never   Substance Use Topics    Alcohol use: Never    Drug use: Never     Review of Systems   Constitutional:  Positive for appetite change. Negative for activity change, chills, diaphoresis, fatigue and fever.   HENT: Negative.     Eyes: Negative.    Respiratory: Negative.  Negative for cough and shortness of breath.    Cardiovascular: Negative.    Gastrointestinal:  Positive for abdominal pain, nausea and vomiting. Negative for constipation and diarrhea.   Genitourinary:  Positive for flank pain. Negative for dysuria and hematuria.   Musculoskeletal:  Negative for arthralgias, back pain, gait problem, myalgias and neck pain.   Skin: Negative.    Neurological: Negative.    Hematological: Negative.    Psychiatric/Behavioral: Negative.         Physical Exam     Initial Vitals [10/23/23 0936]   BP Pulse Resp Temp SpO2   (!) 150/88 84 18 -- 100 %      MAP       --         Physical Exam    Nursing note and vitals reviewed.  Constitutional: She appears well-developed and well-nourished. She is cooperative.  Non-toxic appearance. She does not have a sickly appearance. She appears ill. No distress.    HENT:   Head: Normocephalic and atraumatic.   Right Ear: Tympanic membrane, external ear and ear canal normal.   Left Ear: Tympanic membrane, external ear and ear canal normal.   Nose: Nose normal.   Mouth/Throat: Uvula is midline, oropharynx is clear and moist and mucous membranes are normal.   Eyes: Conjunctivae are normal. Pupils are equal, round, and reactive to light.   Neck: Neck supple.   Normal range of motion.  Cardiovascular:  Normal rate, regular rhythm, normal heart sounds, intact distal pulses and normal pulses.           Pulmonary/Chest: Effort normal and breath sounds normal. No respiratory distress.   Abdominal: Abdomen is soft. Bowel sounds are normal. There is abdominal tenderness in the right upper quadrant.   There is right CVA tenderness.  No left CVA tenderness. There is guarding. There is no rebound.   Musculoskeletal:         General: Normal range of motion.      Cervical back: Normal range of motion and neck supple.     Neurological: She is alert and oriented to person, place, and time.   Skin: Skin is warm, dry and intact. Capillary refill takes less than 2 seconds. No rash noted.   Psychiatric: She has a normal mood and affect. Her speech is normal and behavior is normal. Thought content normal.         Medical Screening Exam   See Full Note    ED Course   Procedures  Labs Reviewed   COMPREHENSIVE METABOLIC PANEL - Abnormal; Notable for the following components:       Result Value    Glucose 107 (*)     Albumin 3.3 (*)     Globulin 4.6 (*)     All other components within normal limits   LIPASE - Abnormal; Notable for the following components:    Lipase 51 (*)     All other components within normal limits   LACTIC ACID, PLASMA - Abnormal; Notable for the following components:    Lactic Acid 2.6 (*)     All other components within normal limits   CBC WITH DIFFERENTIAL - Abnormal; Notable for the following components:    Monocytes % 7.1 (*)     Eosinophils % 0.3 (*)     All other components  within normal limits   CBC W/ AUTO DIFFERENTIAL    Narrative:     The following orders were created for panel order CBC auto differential.  Procedure                               Abnormality         Status                     ---------                               -----------         ------                     CBC with Differential[[id number]]        Abnormal            Final result                 Please view results for these tests on the individual orders.   URINALYSIS, REFLEX TO URINE CULTURE   LACTIC ACID, PLASMA          Imaging Results              US Pelvis Comp with Transvag NON-OB (xpd (Final result)  Result time 10/23/23 11:55:19   Procedure changed from US Pelvis Complete Non OB     Final result by Cornell Martines MD (10/23/23 11:55:19)                   Impression:      Bilateral ovarian cysts.  Recommend follow-up pelvic ultrasound in 10 weeks due to the complex nature of the 28 mm right ovarian cyst.  This may represent a subacute hemorrhagic cyst.    Prior hysterectomy      Electronically signed by: Cornell Martines  Date:    10/23/2023  Time:    11:55               Narrative:    EXAMINATION:  US PELVIS COMP WITH TRANSVAG NON-OB (XPD)    CLINICAL HISTORY:  adnexal cyst;.    COMPARISON:  No previous pelvic ultrasound available    TECHNIQUE:  Real-time ultrasound images are captured and archived.  Initially, transabdominal technique was utilized.  Because of suboptimal visualization of the ovarian abnormalities because of bowel gas and limited detail, transvaginal images were subsequently acquired.    FINDINGS:  The uterus is not visualized compatible with surgical absence.    Right ovary measures 54 x 50 x 33 mm.  There is a 40 x 37 x 36 mm simple cyst associated with the right ovary.  There is also a 28 x 26 by 23 mm complex right ovarian cyst with some complex thin internal septation.    Left ovary measures 38 x 37 x 17 mm and contains a 28 x 27 x 19 mm simple cyst.    There is no free fluid  in the pelvis.    Bladder is moderately distended and generally unremarkable.                                       CT Abdomen Pelvis  Without Contrast (Final result)  Result time 10/23/23 10:46:27      Final result by Felix Obrien MD (10/23/23 10:46:27)                   Impression:      No acute abnormality identified in the abdomen or pelvis.    Bilateral indeterminate ovarian cyst.  Recommend a follow-up pelvic ultrasound when feasible.      Electronically signed by: Felix Obrien  Date:    10/23/2023  Time:    10:46               Narrative:    EXAMINATION:  CT ABDOMEN PELVIS WITHOUT CONTRAST    CLINICAL HISTORY:  Flank pain, kidney stone suspected;    TECHNIQUE:  Low dose axial images, sagittal and coronal reformations were obtained from the lung bases to the pubic symphysis.  Oral contrast was not administered. The CT examination was performed using one or more of the following dose reduction techniques: Automated exposure control, adjustment of the mA and kV according to patient's size, use of acute or iterative reconstruction techniques.    COMPARISON:  None    FINDINGS:  The lung bases are clear.    The liver and gallbladder unremarkable.  Adrenals, spleen, and pancreas appear normal.  There is no renal stone or hydronephrosis of either kidney.    The appendix is seen in normal.  No bowel obstruction or acute bowel abnormality.  No pneumoperitoneum.  No ascites.    There is a cystic structure in the right adnexa measuring 4.2 cm.  Additional cyst left adnexa 2.3 cm.    Urinary bladder unremarkable.  No adenopathy.  Abdominal aorta and iliacs are normal in size.    No acute fracture.                                       Medications   sodium chloride 0.9% bolus 1,000 mL 1,000 mL (0 mLs Intravenous Stopped 10/23/23 1049)   ondansetron injection 4 mg (4 mg Intravenous Given 10/23/23 0948)   HYDROmorphone (PF) injection 1 mg (1 mg Intravenous Given 10/23/23 0948)   ketorolac injection 30 mg (30 mg Intravenous  Given 10/23/23 1057)     Medical Decision Making  BP mildly elevated, otherwise VS wnl. BS+ x 4 quads. Right flank pain that radiates into RLQ of abdomen. Guarding present, but no rebound tenderness noted.     Differential Diagnoses: Renal stone vs Cholecystitis vs Appendicitis vs Gastroenteritis vs Pyelonephritis vs Ovarin Cyst rupture    Amount and/or Complexity of Data Reviewed  Labs: ordered.  Radiology: ordered.     Details: CT abdomin/pelvis:No acute abnormality identified in the abdomen or pelvis.     Bilateral indeterminate ovarian cyst.  Recommend a follow-up pelvic ultrasound when feasible.   Pelvic ultrasound: Bilateral ovarian cysts.  Recommend follow-up pelvic ultrasound in 10 weeks due to the complex nature of the 28 mm right ovarian cyst.  This may represent a subacute hemorrhagic cyst.  Prior hysterectomy  Discussion of management or test interpretation with external provider(s): Discharge MDM  I discussed the patient presentation lab, CT scan and Ultrasound report with patient and her .  Patient was managed in the ED with:  -Bolus NS x 1 liter  -Dilaudid 1 mg IV x 1  -Zofran 4 mg IV x 1  -Toradol 30 mg IV x 1  The response to treatment was pain improved, but not resolved. Nausea resolved, no further vomiting while in the ED. Prescription  given for Zofran if needed for N/V. May take motrin for pain and use moist heating pad. Schedule follow up with PCP. Will needed follow up ultrasound in 10 weeks.   Patient was discharged in stable condition.  Detailed return precautions discussed. Patient agreed to treatment plan and verbalized understanding.     Risk  Prescription drug management.                               Clinical Impression:   Final diagnoses:  [R11.2] Nausea and vomiting, unspecified vomiting type (Primary)  [R10.9] Abdominal pain, unspecified abdominal location - right flank/abdominal pain  [N83.201, N83.202] Cysts of both ovaries        ED Disposition Condition    Discharge Stable           ED Prescriptions       Medication Sig Dispense Start Date End Date Auth. Provider    ondansetron (ZOFRAN-ODT) 4 MG TbDL Take 1 tablet (4 mg total) by mouth every 6 (six) hours as needed. 20 tablet 10/23/2023 -- Marybeth Echols FNP          Follow-up Information       Follow up With Specialties Details Why Contact Info    Shelton Parisi Jr. NP Critical Care Medicine Call today schedule follow up this week [address] [address]  [phone]               Marybeth Echols FNP  10/23/23 1223